# Patient Record
Sex: FEMALE | Race: NATIVE HAWAIIAN OR OTHER PACIFIC ISLANDER
[De-identification: names, ages, dates, MRNs, and addresses within clinical notes are randomized per-mention and may not be internally consistent; named-entity substitution may affect disease eponyms.]

---

## 2018-04-06 ENCOUNTER — HOSPITAL ENCOUNTER (OUTPATIENT)
Dept: HOSPITAL 31 - C.CATHLAB | Age: 53
Discharge: HOME | End: 2018-04-06
Attending: INTERNAL MEDICINE
Payer: COMMERCIAL

## 2018-04-06 VITALS — BODY MASS INDEX: 34.9 KG/M2

## 2018-04-06 DIAGNOSIS — Q21.1: Primary | ICD-10-CM

## 2018-04-06 DIAGNOSIS — I10: ICD-10-CM

## 2018-04-06 DIAGNOSIS — Z91.013: ICD-10-CM

## 2018-04-06 DIAGNOSIS — H91.92: ICD-10-CM

## 2018-04-06 DIAGNOSIS — Z79.82: ICD-10-CM

## 2018-04-06 DIAGNOSIS — I34.0: ICD-10-CM

## 2018-04-06 DIAGNOSIS — E78.00: ICD-10-CM

## 2018-04-06 DIAGNOSIS — I48.91: ICD-10-CM

## 2018-04-06 DIAGNOSIS — R06.02: ICD-10-CM

## 2018-04-06 LAB
APTT BLD: 35 SECONDS (ref 21–34)
BASOPHILS # BLD AUTO: 0 K/UL (ref 0–0.2)
BASOPHILS NFR BLD: 0.7 % (ref 0–2)
BUN SERPL-MCNC: 18 MG/DL (ref 7–17)
CALCIUM SERPL-MCNC: 9.3 MG/DL (ref 8.6–10.4)
EOSINOPHIL # BLD AUTO: 0.2 K/UL (ref 0–0.7)
EOSINOPHIL NFR BLD: 2.9 % (ref 0–4)
ERYTHROCYTE [DISTWIDTH] IN BLOOD BY AUTOMATED COUNT: 13.8 % (ref 11.5–14.5)
GFR NON-AFRICAN AMERICAN: > 60
HGB BLD-MCNC: 14.3 G/DL (ref 11–16)
INR PPP: 1
LYMPHOCYTES # BLD AUTO: 1.6 K/UL (ref 1–4.3)
LYMPHOCYTES NFR BLD AUTO: 21.8 % (ref 20–40)
MCH RBC QN AUTO: 28.2 PG (ref 27–31)
MCHC RBC AUTO-ENTMCNC: 33.8 G/DL (ref 33–37)
MCV RBC AUTO: 83.5 FL (ref 81–99)
MONOCYTES # BLD: 0.5 K/UL (ref 0–0.8)
MONOCYTES NFR BLD: 6.2 % (ref 0–10)
NEUTROPHILS # BLD: 5.1 K/UL (ref 1.8–7)
NEUTROPHILS NFR BLD AUTO: 68.4 % (ref 50–75)
NRBC BLD AUTO-RTO: 0 % (ref 0–2)
PLATELET # BLD: 303 K/UL (ref 130–400)
PMV BLD AUTO: 7.8 FL (ref 7.2–11.7)
PROTHROMBIN TIME: 10.9 SECONDS (ref 9.7–12.2)
RBC # BLD AUTO: 5.06 MIL/UL (ref 3.8–5.2)
WBC # BLD AUTO: 7.5 K/UL (ref 4.8–10.8)

## 2018-04-06 PROCEDURE — 36415 COLL VENOUS BLD VENIPUNCTURE: CPT

## 2018-04-06 PROCEDURE — 85730 THROMBOPLASTIN TIME PARTIAL: CPT

## 2018-04-06 PROCEDURE — 84703 CHORIONIC GONADOTROPIN ASSAY: CPT

## 2018-04-06 PROCEDURE — 93312 ECHO TRANSESOPHAGEAL: CPT

## 2018-04-06 PROCEDURE — 85025 COMPLETE CBC W/AUTO DIFF WBC: CPT

## 2018-04-06 PROCEDURE — 80048 BASIC METABOLIC PNL TOTAL CA: CPT

## 2018-04-06 PROCEDURE — 85610 PROTHROMBIN TIME: CPT

## 2018-04-08 NOTE — CARD
--------------- APPROVED REPORT --------------





EXAM: Transesophageal echocardiogram with color flow Doppler.



INDICATION

ASD 

PFO 



Mitral Valve

E/A ratio0.0



TDI

E/Lateral E'0.0E/Medial E'0.0



Reason For Test : Intra cardiac shunt



PROCEDURE

After obtaining informed consent, patient underwent transesophageal 

echo in the [facility].

Type of Sedation : Conscious Sedation

Sedation was provided by anesthesiologist.

Sedation was achieved with   intravenously. 

The MYCHAL was performed  complications. 

Throughout the procedure, the blood pressure, pulse oximetry, cardiac 

rhythm, and rate were monitored.

The patient tolerated the procedure without adverse effects. Recovery 

from conscious sedation was uneventful and vital signs were stable.



 LEFT VENTRICLE 

The left ventricle is normal size.

There is normal left ventricular wall thickness.

Left ventricle systolic function is normal.

The Ejection Fraction is 60-65%.

There is normal LV segmental wall motion.

No left ventricle thrombus noted on this study.

There is no ventricular septal defect visualized.

There is no left ventricular aneurysm. 



 RIGHT VENTRICLE 

The right ventricle is normal size.

The right ventricular systolic function is normal.



 ATRIA 

The left atrium is moderately dilated. 

The right atrium is moderately dilated. 

Atrial Septal defect noted. Bubble cross over noted. QP/QS=1.8 

suggestive of clnically significant shunt. Right atrium dilated. 

Elevated Pulmonary pressures



 AORTIC VALVE 

The aortic valve is normal in structure.

No aortic regurgitation is present.

There is no aortic valvular stenosis. 

There is no aortic valvular vegetation.



 MITRAL VALVE 

The mitral valve is normal in structure.

There is no evidence of mitral valve prolapse.

There is no mitral valve stenosis.

Mitral regurgitation is mild to moderate.



 TRICUSPID VALVE 

The tricuspid valve is normal in structure.

There is mild tricuspid regurgitation.

There is no tricuspid valve prolapse or vegetation.

There is no tricuspid valve stenosis. 



 PULMONIC VALVE 

The pulmonary valve is normal in structure.

There is no pulmonic valvular regurgitation. 

There is no pulmonic valvular stenosis.



 GREAT VESSELS 

The aortic root is normal in size.



<Conclusion>

Left ventricle systolic function is normal.

The Ejection Fraction is 60-65%.

Mitral regurgitation is mild to moderate.

There is mild tricuspid regurgitation.

The left atrium is moderately dilated.

The right atrium is moderately dilated.

Atrial Septal defect noted. Bubble cross over noted. QP/QS=1.8 

suggestive of clnically significant shunt. Right atrium dilated. 

Elevated Pulmonary pressures

Recommend [facility] followed by Percutaneous ASD closure

## 2018-04-20 ENCOUNTER — HOSPITAL ENCOUNTER (OUTPATIENT)
Dept: HOSPITAL 31 - C.CATHLAB | Age: 53
Discharge: HOME | End: 2018-04-20
Attending: INTERNAL MEDICINE
Payer: COMMERCIAL

## 2018-04-20 VITALS — SYSTOLIC BLOOD PRESSURE: 132 MMHG | DIASTOLIC BLOOD PRESSURE: 77 MMHG | RESPIRATION RATE: 12 BRPM | HEART RATE: 94 BPM

## 2018-04-20 VITALS — TEMPERATURE: 98.1 F

## 2018-04-20 VITALS — BODY MASS INDEX: 34.8 KG/M2

## 2018-04-20 DIAGNOSIS — Q21.1: Primary | ICD-10-CM

## 2018-04-20 DIAGNOSIS — E78.00: ICD-10-CM

## 2018-04-20 DIAGNOSIS — I10: ICD-10-CM

## 2018-04-20 DIAGNOSIS — H91.92: ICD-10-CM

## 2018-04-20 DIAGNOSIS — I48.91: ICD-10-CM

## 2018-04-20 DIAGNOSIS — Z91.013: ICD-10-CM

## 2018-04-20 LAB
ARTERIAL BLOOD GAS HEMOGLOBIN: 14.3 G/DL (ref 11.7–17.4)
ARTERIAL BLOOD GAS O2 SAT: 93.7 % (ref 95–98)
ARTERIAL BLOOD GAS PCO2: 49 MM/HG (ref 35–45)
ARTERIAL BLOOD GAS TCO2: 27.9 MMOL/L (ref 22–28)
BASE EXCESS BLDV CALC-SCNC: -0.4 MMOL/L (ref 0–2)
BASE EXCESS BLDV CALC-SCNC: -0.5 MMOL/L (ref 0–2)
BASE EXCESS BLDV CALC-SCNC: -0.9 MMOL/L (ref 0–2)
BASE EXCESS BLDV CALC-SCNC: -1.1 MMOL/L (ref 0–2)
BASE EXCESS BLDV CALC-SCNC: 0.3 MMOL/L (ref 0–2)
HCO3 BLDA-SCNC: 24.7 MMOL/L (ref 21–28)
INHALED O2 CONCENTRATION: 21 %
PCO2 BLDV: 48 MMHG (ref 40–60)
PCO2 BLDV: 50 MMHG (ref 40–60)
PCO2 BLDV: 51 MMHG (ref 40–60)
PCO2 BLDV: 53 MMHG (ref 40–60)
PCO2 BLDV: 53 MMHG (ref 40–60)
PH BLDA: 7.34 [PH] (ref 7.35–7.45)
PH BLDV: 7.3 [PH] (ref 7.32–7.43)
PH BLDV: 7.32 [PH] (ref 7.32–7.43)
PH BLDV: 7.34 [PH] (ref 7.32–7.43)
PO2 BLDA: 62 MM/HG (ref 80–100)
VENOUS BLOOD FIO2: 21 %
VENOUS BLOOD GAS PO2: 37 MM/HG (ref 30–55)
VENOUS BLOOD GAS PO2: 41 MM/HG (ref 30–55)
VENOUS BLOOD GAS PO2: 44 MM/HG (ref 30–55)
VENOUS BLOOD GAS PO2: 45 MM/HG (ref 30–55)
VENOUS BLOOD GAS PO2: 45 MM/HG (ref 30–55)

## 2018-04-20 PROCEDURE — 93460 R&L HRT ART/VENTRICLE ANGIO: CPT

## 2018-04-20 PROCEDURE — 84703 CHORIONIC GONADOTROPIN ASSAY: CPT

## 2018-04-20 PROCEDURE — 82803 BLOOD GASES ANY COMBINATION: CPT

## 2018-04-20 NOTE — CARDCATH
PROCEDURE DATE:  04/20/2018



PROCEDURES:

1.  Left heart catheterization.

2.  Coronary angiogram.

3.  Right heart catheterization.

4.  Radiological supervision and radiological interpretation of the left

and right heart catheterization with coronary angiogram.



PERFORMING PHYSICIAN:  Brenden Valenzuela MD



REFERRING PHYSICIAN:  Ethan Grande MD



CLINICAL INDICATIONS:

1.  Exertional dyspnea.

2.  Atrial septal defect.

3.  Atrial fibrillation.

4.  Hyperlipidemia.



DESCRIPTION OF PROCEDURE:  After informed consent, the patient was prepped

and draped in the usual sterile fashion.  A 2% lidocaine was given in the

right groin for local anesthesia.  Using micropuncture technique, a

6-Kenyan sheath was introduced into the right common femoral artery.  A

7-Kenyan sheath was introduced into the right common femoral vein.



Using a Butler-Morales catheter, right heart catheterization was performed. 

Right heart pressures were taken.  All the saturations were obtained and

cardiac output calculated.



Then, a 6-Kenyan JL4 diagnostic catheter engaged into left main coronary

artery.  Contrast injected and left coronary angiogram was performed. 

Then, a JR4 6-Kenyan diagnostic catheter engaged into right coronary

artery.  Contrast injected and right coronary angiogram was done.  Then, a

6-Kenyan pigtail catheter inserted into left ventricle across the aortic

valve.  LV end-diastolic pressure measured.  Contrast was injected and LV

angiogram was done.  The catheter was pulled back across the aortic valve. 

Gradient across the aortic valve was measured.



The patient tolerated the procedure well.



FINDINGS OF LEFT HEART CATHETERIZATION:

1.  Left main coronary artery is patent.

2.  LAD and diagonal branches are patent.

3.  Left circumflex and obtuse marginal branches are patent.

4.  Right coronary artery is dominant and patent.

5.  LV ejection fraction is approximately 70%.  Mild mitral regurgitation. 

LV end-diastolic pressure is 23..



FINDINGS OF RIGHT HEART CATHETERIZATION:  Pressures in mmHg:  PCW 14, PA

40/23, mean of _____, RV 37/8, RA 7.  Saturations on room air in

percentage:  PA 78.7%, RV 81%, RA 81.5%, SVC 70.8%, IVC 71.8%.  Saturations

of the aorta is 93.7%.  Cardiac output by Alan method is 6.2 L per minute. 

Cardiac index is 3 L per minute per meter square.



CONCLUSION:

1.  Normal coronaries.

2.  Normal left ventricular systolic function.

3.  Mild pulmonary hypertension.

4.  Step-up in the oxygen is suggestive of atrial septal defect.





__________________________________________

Brendne Valenzuela MD





DD:  04/20/2018 9:03:03

DT:  04/20/2018 9:39:06

Job # [id number]